# Patient Record
(demographics unavailable — no encounter records)

---

## 2024-12-02 NOTE — ASSESSMENT
[FreeTextEntry1] : The patient was advised of the diagnosis. The natural history of the pathology was explained in full to the patient in layman's terms. All questions were answered. The risks and benefits of surgical and non-surgical treatment alternatives were explained in full to the patient.    fx care options discussed, recommend immobilizing shoulder Shoulder immobilizer Light hand ROM in sling Advised to remove sling 3-4 times daily for light elbow ROM CT scan Right shoulder  f/u Dr Gibson after CT to eval for surgical intervention

## 2024-12-02 NOTE — HISTORY OF PRESENT ILLNESS
[de-identified] : 12/02/2024  :RACHELTONI DAILY a84 year old female, presents today for right shoulder FX pt stated she fell landing on shoulder, 11/27/24. [facility], had XR done, stated a fracture on humorous. no numbness or tingling / little swelling. Was given Percocet when she visited hospital.  hx of acid reflux

## 2024-12-11 NOTE — PHYSICAL EXAM
[NL (150)] : flexion 150 degrees [NL (0)] : extension 0 degrees [NL (90)] : supination 90 degrees [Right] : right hand [] : no erythema [FreeTextEntry9] : Deferred due to fx [de-identified] : No numbness lateral arm

## 2024-12-11 NOTE — ASSESSMENT
[FreeTextEntry1] : R displaced 3-4 part proximal humerus fx.  Xrays reviewed.  CT Scan R shoulder ([facility]) - comminuted displaced 3-4 part proximal humerus fx.  Discussed all operative and nonoperative treatment options with patient today. We will proceed with nonoperative route. Continue sling.  NWB. Rest, ice.  RTO in 2 weeks with repeat xrays  Also, because of the incidental nodule in right lung apex found on CT scan, recommend follow up with PCP - they are aware.

## 2024-12-11 NOTE — HISTORY OF PRESENT ILLNESS
[de-identified] : 12/11/2024  :RACHEL DAILY , a84 year old female, presents today for right shoulder, pt has CT results 12/02/24.  from [facility] [place] of shoulder. pt stated she fell over foot, tripped landing on a shoulder end of November.  Pt stated shoulder pain continues.  No numbness or tingling.   CT scan R shoulder ([facility]) 12/2/24  1.  Comminuted subcapital fracture of the proximal right humerus with extension to the superior aspect of the articular surface. Inferior subluxation of the largest portion of the humeral head with respect to the glenoid. 2.  Partially calcified nodule at the right lung apex, suboptimally evaluated on the current study. Further evaluation with nonenhanced CT scan of the chest is recommended for further evaluation.

## 2024-12-11 NOTE — DATA REVIEWED
[CT Scan] : CT scan [Right] : of the right [Shoulder] : shoulder [I independently reviewed and interpreted images and report] : I independently reviewed and interpreted images and report

## 2024-12-23 NOTE — ASSESSMENT
[FreeTextEntry1] : R displaced 3-4 part proximal humerus fx.  CT Scan R shoulder ([facility]) - comminuted displaced 3-4 part proximal humerus fx.  D/c sling. PT for PROM, AROM as tolerated. OK for light ADLs, but 1 lbs WB limit.  RTO 4 weeks to repeat xrays.

## 2024-12-23 NOTE — PHYSICAL EXAM
[Right] : right shoulder [] : tenderness to palpation [FreeTextEntry9] : Deferred due to fx [de-identified] : No numbness lateral arm

## 2024-12-23 NOTE — HISTORY OF PRESENT ILLNESS
[de-identified] : 12/23/24 - follow up right shoulder, 4 weeks. pt states she is doing much better, continues in sling. no numbness or tingling.   12/11/2024  :RACHEL DAILY , a84 year old female, presents today for right shoulder, pt has CT results 12/02/24.  from [facility]. pt stated she fell over foot, tripped landing on a shoulder end of November.  Pt stated shoulder pain continues.  No numbness or tingling.   CT scan R shoulder ([facility]) 12/2/24  1.  Comminuted subcapital fracture of the proximal right humerus with extension to the superior aspect of the articular surface. Inferior subluxation of the largest portion of the humeral head with respect to the glenoid. 2.  Partially calcified nodule at the right lung apex, suboptimally evaluated on the current study. Further evaluation with nonenhanced CT scan of the chest is recommended for further evaluation.

## 2024-12-23 NOTE — IMAGING
[Right] : right shoulder [The fracture is in acceptable alignment. There is progression in healing seen] : The fracture is in acceptable alignment. There is progression in healing seen

## 2025-01-22 NOTE — PHYSICAL EXAM
[Right] : right shoulder [] : motor and sensory intact distally [FreeTextEntry9] : FF 90 [de-identified] : No numbness lateral arm

## 2025-01-22 NOTE — ASSESSMENT
[FreeTextEntry1] : R displaced 3-4 part proximal humerus fx.  CT Scan R shoulder ([facility]) - comminuted displaced 3-4 part proximal humerus fx.  Xrays today Stable and maintained alignment PT for PROM, AROM as tolerated. OK for light ADLs, but 5 lbs WB limit.  RTO 4 weeks to repeat xrays.

## 2025-01-22 NOTE — HISTORY OF PRESENT ILLNESS
[de-identified] : 01/22/25- follow up for right shoulder, pt stated slightly better, stated some discomforting. no numbness   12/23/24 - follow up right shoulder, 4 weeks. pt states she is doing much better, continues in sling. no numbness or tingling.   12/11/2024  :RACHEL DAILY , a84 year old female, presents today for right shoulder, pt has CT results 12/02/24.  from [facility]. pt stated she fell over foot, tripped landing on a shoulder end of November.  Pt stated shoulder pain continues.  No numbness or tingling.   CT scan R shoulder ([facility]) 12/2/24  1.  Comminuted subcapital fracture of the proximal right humerus with extension to the superior aspect of the articular surface. Inferior subluxation of the largest portion of the humeral head with respect to the glenoid. 2.  Partially calcified nodule at the right lung apex, suboptimally evaluated on the current study. Further evaluation with nonenhanced CT scan of the chest is recommended for further evaluation.

## 2025-02-19 NOTE — ASSESSMENT
[FreeTextEntry1] : R displaced 3-4 part proximal humerus fx.  CT Scan R shoulder ([facility]) - comminuted displaced 3-4 part proximal humerus fx.  Xrays today stable alignment in varus with impaction. PT for PROM, AROM as tolerated. OK for light strengthening up to 10 lbs.  RTO 6 weeks to repeat xrays.

## 2025-02-19 NOTE — HISTORY OF PRESENT ILLNESS
[de-identified] : 02/19/25 - Here for follow up for shoulder, now > 2 months from injury. stated therapy going well. ROM is getting slightly better. no numbness. concerned due to swelling in shoulder.   01/22/25- follow up for right shoulder, pt stated slightly better, stated some discomforting. no numbness   12/23/24 - follow up right shoulder, 4 weeks. pt states she is doing much better, continues in sling. no numbness or tingling.  12/11/2024  :RACHEL DAILY , a84 year old female, presents today for right shoulder, pt has CT results 12/02/24.  from [facility]. pt stated she fell over foot, tripped landing on a shoulder end of November.  Pt stated shoulder pain continues.  No numbness or tingling.   CT scan R shoulder ([facility]) 12/2/24  1.  Comminuted subcapital fracture of the proximal right humerus with extension to the superior aspect of the articular surface. Inferior subluxation of the largest portion of the humeral head with respect to the glenoid. 2.  Partially calcified nodule at the right lung apex, suboptimally evaluated on the current study. Further evaluation with nonenhanced CT scan of the chest is recommended for further evaluation.

## 2025-02-19 NOTE — PHYSICAL EXAM
[Right] : right shoulder [] : motor and sensory intact distally [FreeTextEntry9] : FF 90 [de-identified] : No numbness lateral arm

## 2025-04-02 NOTE — PHYSICAL EXAM
[Right] : right shoulder [] : discomfort with strength testing [FreeTextEntry9] : FE: 90A, 120P ER: 40 [de-identified] : No numbness lateral arm

## 2025-04-02 NOTE — IMAGING
[Right] : right shoulder [Fracture] : Fracture [FreeTextEntry1] : maintained alignment of fragments, some callous seen

## 2025-04-02 NOTE — ASSESSMENT
[FreeTextEntry1] : R displaced 3-4 part proximal humerus fx.  CT Scan R shoulder ([facility]) - comminuted displaced 3-4 part proximal humerus fx, slight head split Xrays today stable alignment in varus with impaction. PT for PROM, AROM as tolerated. OK for light strengthening up to 10 lbs.  RTO 6 weeks to repeat xrays.

## 2025-04-02 NOTE — HISTORY OF PRESENT ILLNESS
[de-identified] : 04/02/2025: Patient is here for follow up for right shoulder pain. States that she is feeling worse since the last visit, she stretched and felt some acute pain in the arm.  She now feels some clicking in the shoulder.    02/19/25 - Here for follow up for shoulder, now > 2 months from injury. stated therapy going well. ROM is getting slightly better. no numbness. concerned due to swelling in shoulder.   01/22/25- follow up for right shoulder, pt stated slightly better, stated some discomforting. no numbness   12/23/24 - follow up right shoulder, 4 weeks. pt states she is doing much better, continues in sling. no numbness or tingling.  12/11/2024  :RACHEL DAILY , a84 year old female, presents today for right shoulder, pt has CT results 12/02/24.  from [facility]. pt stated she fell over foot, tripped landing on a shoulder end of November.  Pt stated shoulder pain continues.  No numbness or tingling.   CT scan R shoulder ([facility]) 12/2/24  1.  Comminuted subcapital fracture of the proximal right humerus with extension to the superior aspect of the articular surface. Inferior subluxation of the largest portion of the humeral head with respect to the glenoid. 2.  Partially calcified nodule at the right lung apex, suboptimally evaluated on the current study. Further evaluation with nonenhanced CT scan of the chest is recommended for further evaluation.

## 2025-05-21 NOTE — IMAGING
[Right] : right shoulder [Fracture] : Fracture [FreeTextEntry1] : maintained alignment of fragments, healing present.

## 2025-05-21 NOTE — ASSESSMENT
[FreeTextEntry1] : R displaced 3-4 part proximal humerus fx.    CT Scan R shoulder ([facility]) - comminuted displaced 3-4 part proximal humerus fx, slight head split Xrays today stable alignment in varus with impaction.  Healing present.  If still struggling and in pain in 6 months, we may consider RSA.   We will wait to see how she progresses.  Encouraged HEP.  PT for PROM, AROM as tolerated. Strengthening as tolerated.  Tylenol prn pain.  RTO 2-3 months - repeat xrays.

## 2025-05-21 NOTE — PHYSICAL EXAM
[Right] : right shoulder [] : motor and sensory intact distally [FreeTextEntry9] : FE: 90A, 120P ER: 40 [de-identified] : No numbness lateral arm

## 2025-05-21 NOTE — HISTORY OF PRESENT ILLNESS
[de-identified] : 05/21/25- follow up for shoulder. pain has returned couple weeks ago. clicking has improved a bit. stated acute pain.   04/02/2025: Patient is here for follow up for right shoulder pain. States that she is feeling worse since the last visit, she stretched and felt some acute pain in the arm.  She now feels some clicking in the shoulder.    02/19/25 - Here for follow up for shoulder, now > 2 months from injury. stated therapy going well. ROM is getting slightly better. no numbness. concerned due to swelling in shoulder.   01/22/25- follow up for right shoulder, pt stated slightly better, stated some discomforting. no numbness   12/23/24 - follow up right shoulder, 4 weeks. pt states she is doing much better, continues in sling. no numbness or tingling.  12/11/2024  :RACHEL DAILY , a84 year old female, presents today for right shoulder, pt has CT results 12/02/24.  from [facility]. pt stated she fell over foot, tripped landing on a shoulder end of November.  Pt stated shoulder pain continues.  No numbness or tingling.   CT scan R shoulder ([facility]) 12/2/24  1.  Comminuted subcapital fracture of the proximal right humerus with extension to the superior aspect of the articular surface. Inferior subluxation of the largest portion of the humeral head with respect to the glenoid. 2.  Partially calcified nodule at the right lung apex, suboptimally evaluated on the current study. Further evaluation with nonenhanced CT scan of the chest is recommended for further evaluation.

## 2025-07-21 NOTE — HISTORY OF PRESENT ILLNESS
[Lower back] : lower back [9] : 9 [7] : 7 [Dull/Aching] : dull/aching [Intermittent] : intermittent [Household chores] : household chores [Nothing helps with pain getting better] : Nothing helps with pain getting better [Standing] : standing [Walking] : walking [FreeTextEntry1] : Initial HPI 07/21/2025: Pain started several years ago and is on the BILATERAL lower back described as an achy pain, worse with walking and standing. Has heaviness in the legs with walking.   MRI Lumbar Spine 11/1/23 independently reviewed: L5-S1 spondy; multilevel facet arthropathy  Conservative Care: has been doing PT for the shoulders; has done PT for her back in the past.  Pain Medications: Tylenol and Ibuprofen PRN  Past Injections: none Spine surgery: none  Blood thinners: *pt takes 81mg Aspirin    [] : This patient has had an injection before: no

## 2025-07-21 NOTE — PHYSICAL EXAM
[de-identified] : Constitutional; Appears well, no apparent distress Ability to communicate: Normal  Respiratory: non-labored breathing Skin: No rash noted Head: Normocephalic, atraumatic Neck: no visible thyroid enlargement Eyes: Extraocular movements intact Neurologic: Alert and oriented x3 Psychiatric: normal mood, affect and behavior  [Extension] : extension [] : light touch intact throughout both lower extremities

## 2025-07-21 NOTE — DISCUSSION/SUMMARY
[de-identified] : After discussing various treatment options with the patient including but not limited to oral medications, physical therapy, exercise modalities as well as interventional spinal injections, we have decided with the following plan:   - Continue Home exercises, stretching, activity modification, physical therapy, and conservative care. - MRI report and/or images was reviewed and discussed with the patient. - Recommend L4-5 Lumbar Epidural Steroid Injection under fluoroscopic guidance with image. - The risks, benefits and alternatives of the proposed procedure were explained in detail with the patient. The risks outlined include but are not limited to infection, bleeding, post-dural puncture headache, nerve injury, a temporary increase in pain, failure to resolve symptoms, allergic reaction, symptom recurrence, and possible elevation of blood sugar in diabetics. All questions were answered to patient's apparent satisfaction and he/she verbalized an understanding. - Patient is presenting with acute/sub-acute radicular pain with impairment in ADLs and functionality.  The pain has not responded to conservative care including NSAID therapy and/or physical therapy.  There is no bleeding tendency, unstable medical condition, or systemic infection. - Follow up in 1-2 weeks post injection for re-evaluation. - Can also consider Lumbar MBBs.  - Will call to schedule. *pt takes 81mg Aspirin  - Will provide prescription for Physical Therapy.